# Patient Record
Sex: FEMALE | Race: WHITE | NOT HISPANIC OR LATINO | URBAN - METROPOLITAN AREA
[De-identification: names, ages, dates, MRNs, and addresses within clinical notes are randomized per-mention and may not be internally consistent; named-entity substitution may affect disease eponyms.]

---

## 2017-06-26 ENCOUNTER — EMERGENCY (EMERGENCY)
Facility: HOSPITAL | Age: 27
LOS: 0 days | Discharge: HOME | End: 2017-06-26

## 2017-06-26 DIAGNOSIS — H57.8 OTHER SPECIFIED DISORDERS OF EYE AND ADNEXA: ICD-10-CM

## 2017-06-26 DIAGNOSIS — H53.142 VISUAL DISCOMFORT, LEFT EYE: ICD-10-CM

## 2017-06-26 DIAGNOSIS — F11.20 OPIOID DEPENDENCE, UNCOMPLICATED: ICD-10-CM

## 2017-06-26 DIAGNOSIS — H57.12 OCULAR PAIN, LEFT EYE: ICD-10-CM

## 2018-03-08 ENCOUNTER — OUTPATIENT (OUTPATIENT)
Dept: OUTPATIENT SERVICES | Facility: HOSPITAL | Age: 28
LOS: 1 days | Discharge: HOME | End: 2018-03-08

## 2018-03-08 DIAGNOSIS — F11.20 OPIOID DEPENDENCE, UNCOMPLICATED: ICD-10-CM

## 2018-03-18 ENCOUNTER — EMERGENCY (EMERGENCY)
Facility: HOSPITAL | Age: 28
LOS: 0 days | Discharge: HOME | End: 2018-03-18
Attending: EMERGENCY MEDICINE | Admitting: INTERNAL MEDICINE

## 2018-03-18 VITALS
DIASTOLIC BLOOD PRESSURE: 56 MMHG | RESPIRATION RATE: 18 BRPM | OXYGEN SATURATION: 96 % | HEART RATE: 65 BPM | TEMPERATURE: 98 F | SYSTOLIC BLOOD PRESSURE: 110 MMHG

## 2018-03-18 DIAGNOSIS — R10.9 UNSPECIFIED ABDOMINAL PAIN: ICD-10-CM

## 2018-03-18 DIAGNOSIS — R19.5 OTHER FECAL ABNORMALITIES: ICD-10-CM

## 2018-03-18 DIAGNOSIS — R19.7 DIARRHEA, UNSPECIFIED: ICD-10-CM

## 2018-03-18 NOTE — ED PROVIDER NOTE - MEDICAL DECISION MAKING DETAILS
Patient to be discharged from ED. Patient understands results will not come back immediately. Verbal instructions given, including instructions to return to ED immediately for any new, worsening, or concerning symptoms. Patient endorsed understanding. Written discharge instructions additionally given, including follow-up plan.

## 2018-03-18 NOTE — ED PROVIDER NOTE - CARE PLAN
Assessment and plan of treatment:	Stool does not appear to have any worm segments or other appearance of parasite infection, Only mucus and stool. Sending to lab for eval.

## 2018-03-18 NOTE — ED PROVIDER NOTE - OBJECTIVE STATEMENT
27 y F no pMH pw diarrhea, x 2, starting today, with mucus and 2 small parts of it that the patient was concerned looked like a worm. No recent travel, exposures, unexpected weight loss, or abnormal foods, fever, chills, abd pain, numbness, tingling, change in gait, or prior symptoms. no other family with these symptoms.

## 2018-03-18 NOTE — ED PROVIDER NOTE - PLAN OF CARE
Stool does not appear to have any worm segments or other appearance of parasite infection, Only mucus and stool. Sending to lab for eval.

## 2018-03-18 NOTE — ED PROVIDER NOTE - PHYSICAL EXAMINATION
Vital Signs: I have reviewed the initial vital signs.  Constitutional: NAD, well-nourished, appears stated age, no acute distress.  HEENT: Airway patent, moist MM, no erythema/swelling/deformity of oral structures. EOMI, PERRLA.  CV: regular rate, regular rhythm, well-perfused extremities,  Lungs: BCTA, no focal areas of decreased breath sounds.   ABD: NTND, no guarding or rebound, no pulsatile mass, no hernias.   MSK: Neck supple, nontender, nl ROM, no stepoff. Back nontender in TLS spine or to b/l bony structures or flanks. Ext nontender, nl rom, no deformity.   INTEG: Skin warm, dry, no rash.  NEURO: A&O, normal strength 5/5 all 4 ext, nl sensation throughout, normal speech and coordination.  PSYCH: Calm, cooperative, normal affect and interaction.

## 2018-08-28 ENCOUNTER — OUTPATIENT (OUTPATIENT)
Dept: OUTPATIENT SERVICES | Facility: HOSPITAL | Age: 28
LOS: 1 days | Discharge: HOME | End: 2018-08-28

## 2018-08-28 DIAGNOSIS — F11.20 OPIOID DEPENDENCE, UNCOMPLICATED: ICD-10-CM

## 2018-10-30 ENCOUNTER — OUTPATIENT (OUTPATIENT)
Dept: OUTPATIENT SERVICES | Facility: HOSPITAL | Age: 28
LOS: 1 days | Discharge: HOME | End: 2018-10-30

## 2018-10-30 DIAGNOSIS — F11.20 OPIOID DEPENDENCE, UNCOMPLICATED: ICD-10-CM

## 2018-11-28 ENCOUNTER — OUTPATIENT (OUTPATIENT)
Dept: OUTPATIENT SERVICES | Facility: HOSPITAL | Age: 28
LOS: 1 days | Discharge: HOME | End: 2018-11-28

## 2018-11-28 DIAGNOSIS — F11.20 OPIOID DEPENDENCE, UNCOMPLICATED: ICD-10-CM

## 2018-12-11 ENCOUNTER — OUTPATIENT (OUTPATIENT)
Dept: OUTPATIENT SERVICES | Facility: HOSPITAL | Age: 28
LOS: 1 days | Discharge: HOME | End: 2018-12-11

## 2018-12-11 DIAGNOSIS — F11.20 OPIOID DEPENDENCE, UNCOMPLICATED: ICD-10-CM

## 2019-01-17 ENCOUNTER — OUTPATIENT (OUTPATIENT)
Dept: OUTPATIENT SERVICES | Facility: HOSPITAL | Age: 29
LOS: 1 days | Discharge: HOME | End: 2019-01-17

## 2019-01-17 DIAGNOSIS — F11.20 OPIOID DEPENDENCE, UNCOMPLICATED: ICD-10-CM

## 2019-02-07 ENCOUNTER — OUTPATIENT (OUTPATIENT)
Dept: OUTPATIENT SERVICES | Facility: HOSPITAL | Age: 29
LOS: 1 days | Discharge: HOME | End: 2019-02-07

## 2019-02-07 DIAGNOSIS — F11.20 OPIOID DEPENDENCE, UNCOMPLICATED: ICD-10-CM

## 2019-03-07 ENCOUNTER — OUTPATIENT (OUTPATIENT)
Dept: OUTPATIENT SERVICES | Facility: HOSPITAL | Age: 29
LOS: 1 days | Discharge: HOME | End: 2019-03-07

## 2019-03-07 DIAGNOSIS — F11.20 OPIOID DEPENDENCE, UNCOMPLICATED: ICD-10-CM

## 2019-04-04 ENCOUNTER — OUTPATIENT (OUTPATIENT)
Dept: OUTPATIENT SERVICES | Facility: HOSPITAL | Age: 29
LOS: 1 days | Discharge: HOME | End: 2019-04-04

## 2019-04-04 DIAGNOSIS — F11.20 OPIOID DEPENDENCE, UNCOMPLICATED: ICD-10-CM

## 2019-05-06 ENCOUNTER — OUTPATIENT (OUTPATIENT)
Dept: OUTPATIENT SERVICES | Facility: HOSPITAL | Age: 29
LOS: 1 days | Discharge: HOME | End: 2019-05-06
Payer: MEDICAID

## 2019-05-06 DIAGNOSIS — F11.20 OPIOID DEPENDENCE, UNCOMPLICATED: ICD-10-CM

## 2019-05-06 PROCEDURE — 99212 OFFICE O/P EST SF 10 MIN: CPT

## 2019-06-11 ENCOUNTER — OUTPATIENT (OUTPATIENT)
Dept: OUTPATIENT SERVICES | Facility: HOSPITAL | Age: 29
LOS: 1 days | Discharge: HOME | End: 2019-06-11

## 2019-06-11 DIAGNOSIS — F11.20 OPIOID DEPENDENCE, UNCOMPLICATED: ICD-10-CM

## 2019-06-19 ENCOUNTER — OUTPATIENT (OUTPATIENT)
Dept: OUTPATIENT SERVICES | Facility: HOSPITAL | Age: 29
LOS: 1 days | Discharge: HOME | End: 2019-06-19

## 2019-06-19 DIAGNOSIS — F11.20 OPIOID DEPENDENCE, UNCOMPLICATED: ICD-10-CM

## 2019-06-27 ENCOUNTER — OUTPATIENT (OUTPATIENT)
Dept: OUTPATIENT SERVICES | Facility: HOSPITAL | Age: 29
LOS: 1 days | Discharge: HOME | End: 2019-06-27

## 2019-06-27 DIAGNOSIS — F11.20 OPIOID DEPENDENCE, UNCOMPLICATED: ICD-10-CM

## 2019-07-08 ENCOUNTER — OUTPATIENT (OUTPATIENT)
Dept: OUTPATIENT SERVICES | Facility: HOSPITAL | Age: 29
LOS: 1 days | Discharge: HOME | End: 2019-07-08

## 2019-07-08 NOTE — ED PROVIDER NOTE - NS ED ROS FT
Mervat Spears
Constitutional: (-) fever, (-) chills  Eyes/ENT: (-) blurry vision, (-) epistaxis, (-) sore throat  Cardiovascular: (-) chest pain, (-) syncope  Respiratory: (-) cough, (-) shortness of breath  Gastrointestinal: (-) abdominal pain, (-) vomiting, (+) diarrhea  Musculoskeletal: (-) neck pain, (-) back pain, (-) joint pain  Integumentary: (-) rash, (-) edema  Neurological: (-) headache, (-) altered mental status  Psychiatric: (-) hallucinations,   Allergic/Immunologic: (-) pruritus

## 2019-07-11 ENCOUNTER — OUTPATIENT (OUTPATIENT)
Dept: OUTPATIENT SERVICES | Facility: HOSPITAL | Age: 29
LOS: 1 days | Discharge: HOME | End: 2019-07-11

## 2019-07-11 ENCOUNTER — OUTPATIENT (OUTPATIENT)
Dept: OUTPATIENT SERVICES | Facility: HOSPITAL | Age: 29
LOS: 1 days | Discharge: HOME | End: 2019-07-11
Payer: MEDICAID

## 2019-07-11 DIAGNOSIS — F11.20 OPIOID DEPENDENCE, UNCOMPLICATED: ICD-10-CM

## 2019-07-11 PROCEDURE — 99212 OFFICE O/P EST SF 10 MIN: CPT

## 2019-07-25 ENCOUNTER — OUTPATIENT (OUTPATIENT)
Dept: OUTPATIENT SERVICES | Facility: HOSPITAL | Age: 29
LOS: 1 days | Discharge: HOME | End: 2019-07-25

## 2019-07-25 DIAGNOSIS — F11.20 OPIOID DEPENDENCE, UNCOMPLICATED: ICD-10-CM

## 2019-08-01 ENCOUNTER — OUTPATIENT (OUTPATIENT)
Dept: OUTPATIENT SERVICES | Facility: HOSPITAL | Age: 29
LOS: 1 days | Discharge: HOME | End: 2019-08-01
Payer: MEDICAID

## 2019-08-01 DIAGNOSIS — F11.20 OPIOID DEPENDENCE, UNCOMPLICATED: ICD-10-CM

## 2019-08-01 PROCEDURE — 99212 OFFICE O/P EST SF 10 MIN: CPT

## 2019-08-05 ENCOUNTER — OUTPATIENT (OUTPATIENT)
Dept: OUTPATIENT SERVICES | Facility: HOSPITAL | Age: 29
LOS: 1 days | Discharge: HOME | End: 2019-08-05

## 2019-08-06 DIAGNOSIS — K05.6 PERIODONTAL DISEASE, UNSPECIFIED: ICD-10-CM

## 2022-05-10 ENCOUNTER — EMERGENCY (EMERGENCY)
Facility: HOSPITAL | Age: 32
LOS: 0 days | Discharge: HOME | End: 2022-05-10
Attending: EMERGENCY MEDICINE | Admitting: EMERGENCY MEDICINE
Payer: MEDICAID

## 2022-05-10 VITALS
TEMPERATURE: 98 F | SYSTOLIC BLOOD PRESSURE: 125 MMHG | WEIGHT: 145.06 LBS | OXYGEN SATURATION: 100 % | RESPIRATION RATE: 18 BRPM | DIASTOLIC BLOOD PRESSURE: 70 MMHG | HEART RATE: 85 BPM

## 2022-05-10 DIAGNOSIS — Z87.440 PERSONAL HISTORY OF URINARY (TRACT) INFECTIONS: ICD-10-CM

## 2022-05-10 DIAGNOSIS — R31.9 HEMATURIA, UNSPECIFIED: ICD-10-CM

## 2022-05-10 DIAGNOSIS — R30.0 DYSURIA: ICD-10-CM

## 2022-05-10 DIAGNOSIS — N39.0 URINARY TRACT INFECTION, SITE NOT SPECIFIED: ICD-10-CM

## 2022-05-10 LAB
APPEARANCE UR: ABNORMAL
BACTERIA # UR AUTO: ABNORMAL
BILIRUB UR-MCNC: NEGATIVE — SIGNIFICANT CHANGE UP
COLOR SPEC: YELLOW — SIGNIFICANT CHANGE UP
DIFF PNL FLD: ABNORMAL
EPI CELLS # UR: ABNORMAL /HPF
GLUCOSE UR QL: NEGATIVE MG/DL — SIGNIFICANT CHANGE UP
KETONES UR-MCNC: ABNORMAL
LEUKOCYTE ESTERASE UR-ACNC: ABNORMAL
NITRITE UR-MCNC: NEGATIVE — SIGNIFICANT CHANGE UP
PH UR: 6 — SIGNIFICANT CHANGE UP (ref 5–8)
PROT UR-MCNC: 30 MG/DL
RBC CASTS # UR COMP ASSIST: ABNORMAL /HPF
SP GR SPEC: >=1.03 (ref 1.01–1.03)
UROBILINOGEN FLD QL: 0.2 MG/DL — SIGNIFICANT CHANGE UP
WBC UR QL: ABNORMAL /HPF

## 2022-05-10 PROCEDURE — 99283 EMERGENCY DEPT VISIT LOW MDM: CPT

## 2022-05-10 RX ORDER — NITROFURANTOIN MACROCRYSTAL 50 MG
1 CAPSULE ORAL
Qty: 14 | Refills: 0
Start: 2022-05-10 | End: 2022-05-16

## 2022-05-10 NOTE — ED PROVIDER NOTE - PATIENT PORTAL LINK FT
You can access the FollowMyHealth Patient Portal offered by [facility] by registering at the following website: [email or web address]. By joining [facility]’s FollowMyHealth portal, you will also be able to view your health information using other applications (apps) compatible with our system.

## 2022-05-10 NOTE — ED PROVIDER NOTE - ATTENDING CONTRIBUTION TO CARE
I personally evaluated patient. I agree with the findings and plan with all documentation on chart except as documented  in my note.    32-year-old healthy female presents emergency department with a symptomatic UTI.  No vaginal discharge or flank pain.  Patient is afebrile and eating and drinking normally.  Urine checked and patient is not pregnant we will treat UTI.    Patient is a good candidate to attempt outpatient management. Supportive care and home care discussed in detail. Patient aware they may have to return for re-evaluation and possible admission if outpatient treatment fails. Strict return precautions discussed.

## 2022-05-10 NOTE — ED PROVIDER NOTE - PHYSICAL EXAMINATION
CONSTITUTIONAL: well-developed, well-nourished, in no acute distress  SKIN: warm, dry  HEAD: Normocephalic  EYES: no conjunctival erythema  ENT: no nasal discharge, airway clear  NECK: full ROM  CARD: regular rate and rhythm  RESP: normal respiratory effort  ABD: soft, non-distended, non-tender no cva tenderness  EXT: moving all extremities spontaneously  NEURO: alert and oriented, grossly unremarkable  PSYCH: cooperative, appropriate

## 2022-05-10 NOTE — ED PROVIDER NOTE - OBJECTIVE STATEMENT
32F w/ pmhx of uti who present with dysuria 1x day. symptom consistent with her past uti 3 years ago. lmp 2 weeks ago. she had tinged blood as well associated with her current dysuria. denies hx of sti. sexually active 1 male partner. denies fever chills nausea vomiting flank pain abd pain. denies vaginal rash discharge or malodor. denies frequency.

## 2022-05-10 NOTE — ED PROVIDER NOTE - NS ED ROS FT
Constitutional: No fever   Eyes:  No visual changes  Ears:  No hearing changes  Neck: No neck pain  Cardiac:  No chest pain  Respiratory:  No SOB   GI:  No abdominal pain, nausea, or vomiting  :  +dysuria +hematuria -frequency -vaginal sxs  MS:  No back pain  Neuro:  No headache or weakness.  No LOC  Skin:  No skin rash

## 2022-05-10 NOTE — ED PROVIDER NOTE - NSFOLLOWUPINSTRUCTIONS_ED_ALL_ED_FT
Urinary Tract Infection    You were seen and evelauted in the Emergency Department. It was found to you have a Urinary Tract Infection (UTI). It has been determined that it is safe for you to be discharged and attempt outpatient management. Please take antibiotic as prescribed and monitor your symptoms. If your antibiotic needs to be changed based on test results, you will be contacted. There is a chance you may have to return for re-evaluation and possible admission if oral antibiotics are not working. Please monitor your symptoms and see how you feel after 48 hours of antibiotics, unless one of the strict return precautions we discussed happens. Follow up with your doctor and bring all of your results. You may require further work up and management, which may include evaluaiton by a specialist (Urologist)    Overview  A urinary tract infection, or UTI, is a general term for an infection anywhere between the kidneys and the urethra (where urine comes out). Most UTIs are bladder infections. They often cause pain or burning when you urinate.    UTIs are caused by bacteria and can be cured with antibiotics. Be sure to complete your treatment so that the infection does not get worse.    Follow-up care is a key part of your treatment and safety. Be sure to make and go to all appointments, and call your doctor or nurse call line if you are having problems. It's also a good idea to know your test results and keep a list of the medicines you take.    How can you care for yourself at home?  Take your antibiotics as directed. Do not stop taking them just because you feel better. You need to take the full course of antibiotics.  Drink extra water and other fluids for the next day or two. This will help make the urine less concentrated and help wash out the bacteria that are causing the infection. (If you have kidney, heart, or liver disease and have to limit fluids, talk with your doctor before you increase the amount of fluids you drink.)  Avoid drinks that are carbonated or have caffeine. They can irritate the bladder.  Urinate often. Try to empty your bladder each time.  To relieve pain, take a hot bath or lay a heating pad set on low over your lower belly or genital area. Never go to sleep with a heating pad in place.  To prevent UTIs  Drink plenty of water each day. This helps you urinate often, which clears bacteria from your system. (If you have kidney, heart, or liver disease and have to limit fluids, talk with your doctor before you increase the amount of fluids you drink.)  Urinate when you need to.  If you are sexually active, urinate right after you have sex.  Change sanitary pads often.  Avoid douches, bubble baths, feminine hygiene sprays, and other feminine hygiene products that have deodorants.  After you use the toilet, wipe from front to back.  When should you call for help?  	  Call your doctor or nurse call line now or seek immediate medical care if:    Symptoms such as fever, chills, nausea, or vomiting get worse or appear for the first time.  You have new pain in your back just below your rib cage. This is called flank pain.  There is new blood or pus in your urine.  You have any problems with your antibiotic medicine.  Watch closely for changes in your health, and be sure to contact your doctor or nurse call line if:    You do not feel better after 2 days on an antibiotic.  Your symptoms go away but then come back.

## 2022-05-10 NOTE — ED PROVIDER NOTE - CLINICAL SUMMARY MEDICAL DECISION MAKING FREE TEXT BOX
32-year-old healthy female presents emergency department with a symptomatic UTI.  No vaginal discharge or flank pain.  Patient is afebrile and eating and drinking normally.  Urine checked and patient is not pregnant we will treat UTI.    Patient is a good candidate to attempt outpatient management. Supportive care and home care discussed in detail. Patient aware they may have to return for re-evaluation and possible admission if outpatient treatment fails. Strict return precautions discussed.

## 2022-09-23 NOTE — ED ADULT TRIAGE NOTE - BP NONINVASIVE DIASTOLIC (MM HG)
Called pt. HECTOR on  stating calling to remind him of appointment for stress test Monday 9/26 at 2:00 and to arrive at least 10 minutes early.  I stated if not canceled or rescheduled today and he unable to make it on Monday for some reason then test would need to be scheduled as outpatient at the hospital. 70

## 2022-10-17 ENCOUNTER — OUTPATIENT (OUTPATIENT)
Dept: OUTPATIENT SERVICES | Facility: HOSPITAL | Age: 32
LOS: 1 days | Discharge: HOME | End: 2022-10-17

## 2022-10-17 NOTE — ED ADULT NURSE NOTE - NS ED NURSE LEVEL OF CONSCIOUSNESS SPEECH
Protocol not met: please advise     Rx requested:    Disp Refills Start End    fluticasone (FLONASE) 50 MCG/ACT nasal spray 16 g 5 7/20/2021     Sig - Route: Spray 2 sprays in each nostril daily. - Nasal    Patient taking differently: Spray 2 sprays in each nostril as needed.        Sent to pharmacy as: Fluticasone Propionate 50 MCG/ACT Nasal Suspension (FLONASE)    Class: Eprescribe    E-Prescribing Status: Receipt confirmed by pharmacy (7/20/2021 10:21 AM CDT)        Last appointment date: 10/20/2022    Provider seen: Yumiko    Next appointment date: 10/20/2022 with: Yumiko    
Speaking Coherently

## 2023-09-29 ENCOUNTER — OUTPATIENT (OUTPATIENT)
Dept: OUTPATIENT SERVICES | Facility: HOSPITAL | Age: 33
LOS: 1 days | End: 2023-09-29
Payer: COMMERCIAL

## 2023-09-29 DIAGNOSIS — K02.52 DENTAL CARIES ON PIT AND FISSURE SURFACE PENETRATING INTO DENTIN: ICD-10-CM

## 2023-09-29 DIAGNOSIS — K02.62 DENTAL CARIES ON SMOOTH SURFACE PENETRATING INTO DENTIN: ICD-10-CM

## 2023-09-29 PROCEDURE — D2160: CPT

## 2023-11-07 ENCOUNTER — APPOINTMENT (OUTPATIENT)
Dept: PLASTIC SURGERY | Facility: CLINIC | Age: 33
End: 2023-11-07
Payer: COMMERCIAL

## 2023-11-07 VITALS — BODY MASS INDEX: 27.49 KG/M2 | WEIGHT: 165 LBS | HEIGHT: 65 IN

## 2023-11-07 DIAGNOSIS — Z78.9 OTHER SPECIFIED HEALTH STATUS: ICD-10-CM

## 2023-11-07 PROCEDURE — 99203 OFFICE O/P NEW LOW 30 MIN: CPT

## 2023-11-07 RX ORDER — BUPRENORPHINE HCL 75 MCG
75 FILM, MEDICATED (EA) BUCCAL
Refills: 0 | Status: ACTIVE | COMMUNITY

## 2023-12-22 ENCOUNTER — APPOINTMENT (OUTPATIENT)
Dept: PLASTIC SURGERY | Facility: CLINIC | Age: 33
End: 2023-12-22
Payer: COMMERCIAL

## 2023-12-22 DIAGNOSIS — D48.5 NEOPLASM OF UNCERTAIN BEHAVIOR OF SKIN: ICD-10-CM

## 2023-12-22 PROCEDURE — 13101 CMPLX RPR TRUNK 2.6-7.5 CM: CPT

## 2023-12-22 PROCEDURE — 11404 EXC TR-EXT B9+MARG 3.1-4 CM: CPT

## 2023-12-22 NOTE — PROCEDURE
[FreeTextEntry6] : Patient is a 33 year old female with a central chest keloid measuring approximately 4 x 1.5 cm.    The area was prepped and draped in the usual fashion.  Local anesthetic was administered using 1% lidocaine with epinephrine.  Lesion was sharply excised.  Area was irrigated copiously.  Complex wound closure was performed in layers.  The wound measured approximately 4.2 cm.  0.1 mg kenalog injected.  Sterile dressing applied.    Patient tolerated procedure well and understands post-op instructions.  Sutures Used: 3-0 monocryl

## 2023-12-27 LAB — CORE LAB BIOPSY: NORMAL

## 2024-01-05 ENCOUNTER — APPOINTMENT (OUTPATIENT)
Dept: PLASTIC SURGERY | Facility: CLINIC | Age: 34
End: 2024-01-05
Payer: COMMERCIAL

## 2024-01-05 PROCEDURE — 99213 OFFICE O/P EST LOW 20 MIN: CPT | Mod: 25

## 2024-01-05 NOTE — HISTORY OF PRESENT ILLNESS
[FreeTextEntry1] : 32 y/o female with no significant pmh who presents for evaluation of    Occ: self employed  as above  22 yr h/o central chest keloid  m,easures approx 1cm x 3xcm  no prior tx  dicussed all options  nmonsmoker nondiabetic  Interval hx (1/5/24): Pt is 2 weeks s/p excision of central chest keloid with kenalog injection. Here for her first post-op. Doing well, denies fever/chills/drainage or tenderness.

## 2024-01-05 NOTE — PHYSICAL EXAM
[de-identified] : Well developed, well nourished in NAD  [de-identified] : Atraumatic, normocephalic  [de-identified] : central chest horizontal 3cm incision is CDI, no open areas/drainage/cellulitis/erythema. Small protruding monocryl at distal end. No evidence of HTS yet

## 2024-01-05 NOTE — ASSESSMENT
[FreeTextEntry1] : dicsussed options excision excision + steroids excision + post RT pt elects for excision w post procedure steroid injection  Pt is 2 weeks s/p excision of central chest keloid with kenalog injection.  - d/c protruding monocryl - Aquaphor x3 d then switch to silicone scar sheets - s/s infection reviewed - f/u 4-6 weeks for possible kenalog injection

## 2024-01-05 NOTE — DATA REVIEWED
[FreeTextEntry1] : Patient:     SHANTE BELLAMY   Accession:                             [id number]  Collected Date/Time:                   12/22/2023 10:22 EST Received Date/Time:                    12/26/2023 09:27 EST  Surgical Pathology Report - Auth (Verified)  Specimen(s) Submitted Central chest keloid  Final Diagnosis Central chest keloid, excision: -  Keloid.  Verified by: Kandi Mead M.D. (Electronic Signature) Reported on: 12/27/23 15:37 EST, [facility], [address] [place] [address] Phone: [phone]   Fax: [phone] _________________________________________________________________  Clinical Information Central chest keloid   Perioperative Diagnosis D48.5-Neoplasm of uncertain behavior of skin  Gross Description The specimen is received in formalin labeled "central chest keloid" and consists an unoriented skin ellipse with attached subcutaneous tissue measuring 2.5 x 0.8 x 0.5 cm.  On the skin surface there is a firm raised area measuring 1.3 cm in maximum diameter.  The deep margin is inked black. Serial sectioning shows tan-white firm fibrotic area. Representative sections are submitted. (1 block)  12/26/2023 14:21:57 EST  st   Disclaimer In addition to other data that may appear on the specimen containers, all labels have been inspected to confirm the presence of the patients name and date of birth.  Specimen was received and underwent gross examination at [facility], [address] [place] [address].  Ordered by: RONNY MACHADO IV       Collected/Examined: 50Wsv4872 10:22AM       Verification Required       Stage: Final       Performed at: [facility] (Med Director: Gage Lopez)       Resulted: 90Dho6819 03:37PM       Last Updated: 27Dec2023 03:37PM       Accession: [id number]       Results Hx:	 There are no additional results to show Details:	 To Be Done:	67Ujs5585 Status:	Resulted: Requires Verification For:	Neoplasm of uncertain behavior of skin (D48.5) Overdue:	38Fyn9575 10:19AM To Be Performed:	[facility] Communicated By:	Requested transmission to performing location Priority:	Routine Ordered By:	RONNY MACHADO IV Supervised By:	RONNY MACHADO IV Managed By:	RONNY MACHADO IV Authorization:	Not Required Performing Instructions:	 Patient Instructions:	 Order Instructions:	 Questions:	 Date/Time Collected A: 23Imk1683 Number of Sites (Enter each site description below.) A: 1 Site of Specimen A: Central chest keloid Add'l Details:	 Financial Auth:	Not Needed Authorization #:	 Appt. Status:	Appointment Not Needed Effective:	28Urh2629 12:00AM Expires:	43Rkl2466 12:00AM Done:	09Fre6028 10:22AM Order #:	[id number] Requisition #:	[id number] Label Type:	 Collection:	Collection Specimen Identifier:	 ID:	 CPT:	 LOINC:	 SNOMED:	 Type:	 Charges:	None Will Be Collected in Office?	No View link item history	 Goals:	None Charging:	          (none) Encounters:	 Creation:	[id number] Appointment RONNY MACHADO IV (Plastic Surgery)  Collection:	None Specified Be Done By:	None Specified Scheduled:	None Specified Performed:	None Specified Charge :	None Specified Annotations:	          (none) Electronically Signed By: RONNY MACHADO IV, MD 22-Dec-2023 10:20 AM

## 2024-01-29 ENCOUNTER — APPOINTMENT (OUTPATIENT)
Dept: PLASTIC SURGERY | Facility: CLINIC | Age: 34
End: 2024-01-29
Payer: COMMERCIAL

## 2024-01-29 PROCEDURE — 99024 POSTOP FOLLOW-UP VISIT: CPT

## 2024-01-29 NOTE — ASSESSMENT
[FreeTextEntry1] :   Pt is almost 6 weeks s/p excision of central chest keloid with kenalog injection.   - Aquaphor only to R distal wound  -Ok to continue silicone scar sheet to remainder of incision along with heat and scar massgae (Pt feels subq stitch at other end of incision) - s/s infection reviewed - Keep f/u next month for possible kenalog injection / wound check

## 2024-01-29 NOTE — PHYSICAL EXAM
[de-identified] : Well developed, well nourished in NAD  [de-identified] : Atraumatic, normocephalic  [de-identified] : central chest horizontal 3cm incision with 2mm circular partial thickness wound at R distal aspect of incision, healthy pink wound bed with no cellulitis or drainage. Remainder of incision line is CDI, no open areas/drainage/cellulitis/erythema.  No evidence of HTS yet

## 2024-01-29 NOTE — DATA REVIEWED
[FreeTextEntry1] : Patient:     SHANTE BELLAMY   Accession:                             [id number]  Collected Date/Time:                   12/22/2023 10:22 EST Received Date/Time:                    12/26/2023 09:27 EST  Surgical Pathology Report - Auth (Verified)  Specimen(s) Submitted Central chest keloid  Final Diagnosis Central chest keloid, excision: -  Keloid.  Verified by: Kandi Mead M.D. (Electronic Signature) Reported on: 12/27/23 15:37 EST, [facility], [address] [place] [address] Phone: [phone]   Fax: [phone] _________________________________________________________________  Clinical Information Central chest keloid   Perioperative Diagnosis D48.5-Neoplasm of uncertain behavior of skin  Gross Description The specimen is received in formalin labeled "central chest keloid" and consists an unoriented skin ellipse with attached subcutaneous tissue measuring 2.5 x 0.8 x 0.5 cm.  On the skin surface there is a firm raised area measuring 1.3 cm in maximum diameter.  The deep margin is inked black. Serial sectioning shows tan-white firm fibrotic area. Representative sections are submitted. (1 block)  12/26/2023 14:21:57 EST  st   Disclaimer In addition to other data that may appear on the specimen containers, all labels have been inspected to confirm the presence of the patients name and date of birth.  Specimen was received and underwent gross examination at [facility], [address] [place] [address].  Ordered by: RONNY MACHADO IV       Collected/Examined: 94Maq4860 10:22AM       Verification Required       Stage: Final       Performed at: [facility] (Med Director: Gage Lopez)       Resulted: 70Twn3329 03:37PM       Last Updated: 27Dec2023 03:37PM       Accession: [id number]       Results Hx:	 There are no additional results to show Details:	 To Be Done:	78Sdv8837 Status:	Resulted: Requires Verification For:	Neoplasm of uncertain behavior of skin (D48.5) Overdue:	46Cyp1235 10:19AM To Be Performed:	[facility] Communicated By:	Requested transmission to performing location Priority:	Routine Ordered By:	RONNY MACHADO IV Supervised By:	RONNY MACHADO IV Managed By:	RONNY MACHADO IV Authorization:	Not Required Performing Instructions:	 Patient Instructions:	 Order Instructions:	 Questions:	 Date/Time Collected A: 08Jad4592 Number of Sites (Enter each site description below.) A: 1 Site of Specimen A: Central chest keloid Add'l Details:	 Financial Auth:	Not Needed Authorization #:	 Appt. Status:	Appointment Not Needed Effective:	48Kxh9933 12:00AM Expires:	65Ilx3294 12:00AM Done:	03Wwz4826 10:22AM Order #:	[id number] Requisition #:	[id number] Label Type:	 Collection:	Collection Specimen Identifier:	 ID:	 CPT:	 LOINC:	 SNOMED:	 Type:	 Charges:	None Will Be Collected in Office?	No View link item history	 Goals:	None Charging:	          (none) Encounters:	 Creation:	[id number] Appointment RONNY MACHADO IV (Plastic Surgery)  Collection:	None Specified Be Done By:	None Specified Scheduled:	None Specified Performed:	None Specified Charge :	None Specified Annotations:	          (none) Electronically Signed By: RONNY MACHADO IV, MD 22-Dec-2023 10:20 AM

## 2024-01-29 NOTE — HISTORY OF PRESENT ILLNESS
[FreeTextEntry1] : 34 y/o female with no significant pmh who presents for evaluation of central chest keloid.   Occ: self employed  measures approx 1cm x 3xcm no prior tx  nonsmoker nondiabetic  Interval hx (1/5/24): Pt is 2 weeks s/p excision of central chest keloid with kenalog injection. Here for her first post-op. Doing well, denies fever/chills/drainage or tenderness.   Interval hx (1/29/24): Pt is almost 6 weeks s/p excision of central chest keloid with kenalog injection. Here with concern of new opening on incision line. Reports small amount ot thin yellow tinged drainage. Using silicone strip to remainder of incision. Denies f/c.

## 2024-02-27 ENCOUNTER — APPOINTMENT (OUTPATIENT)
Dept: PLASTIC SURGERY | Facility: CLINIC | Age: 34
End: 2024-02-27

## 2024-03-05 ENCOUNTER — OUTPATIENT (OUTPATIENT)
Dept: OUTPATIENT SERVICES | Facility: HOSPITAL | Age: 34
LOS: 1 days | End: 2024-03-05
Payer: COMMERCIAL

## 2024-03-05 DIAGNOSIS — K02.9 DENTAL CARIES, UNSPECIFIED: ICD-10-CM

## 2024-03-05 PROCEDURE — D0220: CPT

## 2024-03-05 PROCEDURE — D9110: CPT

## 2024-03-06 DIAGNOSIS — K02.9 DENTAL CARIES, UNSPECIFIED: ICD-10-CM

## 2024-04-09 ENCOUNTER — APPOINTMENT (OUTPATIENT)
Dept: PLASTIC SURGERY | Facility: CLINIC | Age: 34
End: 2024-04-09
Payer: COMMERCIAL

## 2024-04-09 PROCEDURE — 99212 OFFICE O/P EST SF 10 MIN: CPT

## 2024-04-09 NOTE — PHYSICAL EXAM
[de-identified] : Well developed, well nourished in NAD  [de-identified] : Atraumatic, normocephalic  [de-identified] : central chest with raised 3x0.7 cm keloid scar

## 2024-04-09 NOTE — ASSESSMENT
[FreeTextEntry1] : 34 yo F 3.5 months s/p excision of central chest keloid with Kenalog injection now with recurrent keloid.   - Kenalog injection vs. re-excision with intraop kenalog injection    as above recurrent sternal keloid  discussed options pt still wants to avoid post excision RT  d/w Dr Perez pt to see Chris to see if laser tx an option.  otherwise could consider repeat excision w wider margins, in [place], w higher dose steroid injection  explained plan to pt--she understands and agrees.

## 2024-04-09 NOTE — DATA REVIEWED
[FreeTextEntry1] : Patient:     SHANTE BELLAMY   Accession:                             [id number]  Collected Date/Time:                   12/22/2023 10:22 EST Received Date/Time:                    12/26/2023 09:27 EST  Surgical Pathology Report - Auth (Verified)  Specimen(s) Submitted Central chest keloid  Final Diagnosis Central chest keloid, excision: -  Keloid.  Verified by: Kandi Mead M.D. (Electronic Signature) Reported on: 12/27/23 15:37 EST, [facility], [address] [place] [address] Phone: [phone]   Fax: [phone] _________________________________________________________________  Clinical Information Central chest keloid   Perioperative Diagnosis D48.5-Neoplasm of uncertain behavior of skin  Gross Description The specimen is received in formalin labeled "central chest keloid" and consists an unoriented skin ellipse with attached subcutaneous tissue measuring 2.5 x 0.8 x 0.5 cm.  On the skin surface there is a firm raised area measuring 1.3 cm in maximum diameter.  The deep margin is inked black. Serial sectioning shows tan-white firm fibrotic area. Representative sections are submitted. (1 block)  12/26/2023 14:21:57 EST  st   Disclaimer In addition to other data that may appear on the specimen containers, all labels have been inspected to confirm the presence of the patients name and date of birth.  Specimen was received and underwent gross examination at [facility], [address] [place] [address].  Ordered by: RONNY MACHADO IV       Collected/Examined: 39Zqh6807 10:22AM       Verification Required       Stage: Final       Performed at: [facility] (Med Director: Gage Lopez)       Resulted: 28Glz9433 03:37PM       Last Updated: 27Dec2023 03:37PM       Accession: [id number]       Results Hx:	 There are no additional results to show Details:	 To Be Done:	02Onu0514 Status:	Resulted: Requires Verification For:	Neoplasm of uncertain behavior of skin (D48.5) Overdue:	20Lam0911 10:19AM To Be Performed:	[facility] Communicated By:	Requested transmission to performing location Priority:	Routine Ordered By:	RONNY MACHADO IV Supervised By:	RONNY MACHADO IV Managed By:	RONNY MACHADO IV Authorization:	Not Required Performing Instructions:	 Patient Instructions:	 Order Instructions:	 Questions:	 Date/Time Collected A: 71Twe5961 Number of Sites (Enter each site description below.) A: 1 Site of Specimen A: Central chest keloid Add'l Details:	 Financial Auth:	Not Needed Authorization #:	 Appt. Status:	Appointment Not Needed Effective:	71Kka9151 12:00AM Expires:	66Ajr6087 12:00AM Done:	38Tnw2573 10:22AM Order #:	[id number] Requisition #:	[id number] Label Type:	 Collection:	Collection Specimen Identifier:	 ID:	 CPT:	 LOINC:	 SNOMED:	 Type:	 Charges:	None Will Be Collected in Office?	No View link item history	 Goals:	None Charging:	          (none) Encounters:	 Creation:	[id number] Appointment RONNY MACHADO IV (Plastic Surgery)  Collection:	None Specified Be Done By:	None Specified Scheduled:	None Specified Performed:	None Specified Charge :	None Specified Annotations:	          (none) Electronically Signed By: RONNY MACHADO IV, MD 22-Dec-2023 10:20 AM

## 2024-04-09 NOTE — HISTORY OF PRESENT ILLNESS
[FreeTextEntry1] : 32 y/o female with no significant pmh who presents for evaluation of central chest keloid.   Occ: self employed  measures approx 1cm x 3xcm no prior tx  nonsmoker nondiabetic  Interval hx (1/5/24): Pt is 2 weeks s/p excision of central chest keloid with kenalog injection. Here for her first post-op. Doing well, denies fever/chills/drainage or tenderness.   Interval hx (1/29/24): Pt is almost 6 weeks s/p excision of central chest keloid with kenalog injection. Here with concern of new opening on incision line. Reports small amount ot thin yellow tinged drainage. Using silicone strip to remainder of incision. Denies f/c.   Interval hx (4/9/24): Pt is here 3.5 months s/p excision of central chest keloid with kenalog injection concerned with keloid recurrence c/o pressure, discomfort and unpleasant aesthetic result. Pt has used silicone tape after surgery.

## 2024-04-23 ENCOUNTER — OUTPATIENT (OUTPATIENT)
Dept: OUTPATIENT SERVICES | Facility: HOSPITAL | Age: 34
LOS: 1 days | End: 2024-04-23
Payer: COMMERCIAL

## 2024-04-23 ENCOUNTER — APPOINTMENT (OUTPATIENT)
Dept: BURN CARE | Facility: CLINIC | Age: 34
End: 2024-04-23
Payer: COMMERCIAL

## 2024-04-23 DIAGNOSIS — Z00.8 ENCOUNTER FOR OTHER GENERAL EXAMINATION: ICD-10-CM

## 2024-04-23 PROCEDURE — 99202 OFFICE O/P NEW SF 15 MIN: CPT
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
## 2024-04-23 NOTE — REASON FOR VISIT
[Initial] : initial visit [Were you seen in the Emergency Room?] : not seen in the emergency room [Were you admitted to the burn center at [facility]?] : not admitted to the burn center at [facility]

## 2024-04-23 NOTE — ASSESSMENT
[FreeTextEntry1] : The recurrent chest keloid measures 2x3cm  and is nontender.  The patient was instructed to clean  the wound with soap and water. Continue local wound care with moisturizer and sunscreen. Will CO2 laser.  Follow up 1-2 months.  [Wound Care] : wound care

## 2024-04-23 NOTE — PHYSICAL EXAM
[Closed] : closed [Size%: ______] : Size: [unfilled]% [Infected?] : Infected: No [0] : 0 out of 10 [Normal] : normal [None] : none [] : no [de-identified] : The recurrent chest keloid measures 2x3cm  and is nontender.  The patient was instructed to clean  the wound with soap and water. Continue local wound care with moisturizer and sunscreen. Will CO2 laser.  Follow up 1-2 months.

## 2024-04-24 DIAGNOSIS — L91.0 HYPERTROPHIC SCAR: ICD-10-CM

## 2024-05-14 ENCOUNTER — OUTPATIENT (OUTPATIENT)
Dept: OUTPATIENT SERVICES | Facility: HOSPITAL | Age: 34
LOS: 1 days | End: 2024-05-14
Payer: COMMERCIAL

## 2024-05-14 VITALS
OXYGEN SATURATION: 99 % | HEIGHT: 65 IN | SYSTOLIC BLOOD PRESSURE: 112 MMHG | HEART RATE: 77 BPM | WEIGHT: 169.98 LBS | DIASTOLIC BLOOD PRESSURE: 70 MMHG | TEMPERATURE: 98 F | RESPIRATION RATE: 18 BRPM

## 2024-05-14 DIAGNOSIS — L91.0 HYPERTROPHIC SCAR: ICD-10-CM

## 2024-05-14 DIAGNOSIS — Z98.890 OTHER SPECIFIED POSTPROCEDURAL STATES: Chronic | ICD-10-CM

## 2024-05-14 DIAGNOSIS — Z01.818 ENCOUNTER FOR OTHER PREPROCEDURAL EXAMINATION: ICD-10-CM

## 2024-05-14 LAB
ALBUMIN SERPL ELPH-MCNC: 4.6 G/DL — SIGNIFICANT CHANGE UP (ref 3.5–5.2)
ALP SERPL-CCNC: 58 U/L — SIGNIFICANT CHANGE UP (ref 30–115)
ALT FLD-CCNC: 13 U/L — SIGNIFICANT CHANGE UP (ref 0–41)
ANION GAP SERPL CALC-SCNC: 11 MMOL/L — SIGNIFICANT CHANGE UP (ref 7–14)
AST SERPL-CCNC: 16 U/L — SIGNIFICANT CHANGE UP (ref 0–41)
BASOPHILS # BLD AUTO: 0.05 K/UL — SIGNIFICANT CHANGE UP (ref 0–0.2)
BASOPHILS NFR BLD AUTO: 0.7 % — SIGNIFICANT CHANGE UP (ref 0–1)
BILIRUB SERPL-MCNC: 0.5 MG/DL — SIGNIFICANT CHANGE UP (ref 0.2–1.2)
BUN SERPL-MCNC: 14 MG/DL — SIGNIFICANT CHANGE UP (ref 10–20)
CALCIUM SERPL-MCNC: 9.4 MG/DL — SIGNIFICANT CHANGE UP (ref 8.4–10.5)
CHLORIDE SERPL-SCNC: 101 MMOL/L — SIGNIFICANT CHANGE UP (ref 98–110)
CO2 SERPL-SCNC: 26 MMOL/L — SIGNIFICANT CHANGE UP (ref 17–32)
CREAT SERPL-MCNC: 0.7 MG/DL — SIGNIFICANT CHANGE UP (ref 0.7–1.5)
EGFR: 118 ML/MIN/1.73M2 — SIGNIFICANT CHANGE UP
EOSINOPHIL # BLD AUTO: 0.11 K/UL — SIGNIFICANT CHANGE UP (ref 0–0.7)
EOSINOPHIL NFR BLD AUTO: 1.5 % — SIGNIFICANT CHANGE UP (ref 0–8)
GLUCOSE SERPL-MCNC: 80 MG/DL — SIGNIFICANT CHANGE UP (ref 70–99)
HCT VFR BLD CALC: 40.9 % — SIGNIFICANT CHANGE UP (ref 37–47)
HGB BLD-MCNC: 14.1 G/DL — SIGNIFICANT CHANGE UP (ref 12–16)
IMM GRANULOCYTES NFR BLD AUTO: 0.3 % — SIGNIFICANT CHANGE UP (ref 0.1–0.3)
LYMPHOCYTES # BLD AUTO: 1.99 K/UL — SIGNIFICANT CHANGE UP (ref 1.2–3.4)
LYMPHOCYTES # BLD AUTO: 28 % — SIGNIFICANT CHANGE UP (ref 20.5–51.1)
MCHC RBC-ENTMCNC: 28.6 PG — SIGNIFICANT CHANGE UP (ref 27–31)
MCHC RBC-ENTMCNC: 34.5 G/DL — SIGNIFICANT CHANGE UP (ref 32–37)
MCV RBC AUTO: 83 FL — SIGNIFICANT CHANGE UP (ref 81–99)
MONOCYTES # BLD AUTO: 0.35 K/UL — SIGNIFICANT CHANGE UP (ref 0.1–0.6)
MONOCYTES NFR BLD AUTO: 4.9 % — SIGNIFICANT CHANGE UP (ref 1.7–9.3)
NEUTROPHILS # BLD AUTO: 4.59 K/UL — SIGNIFICANT CHANGE UP (ref 1.4–6.5)
NEUTROPHILS NFR BLD AUTO: 64.6 % — SIGNIFICANT CHANGE UP (ref 42.2–75.2)
NRBC # BLD: 0 /100 WBCS — SIGNIFICANT CHANGE UP (ref 0–0)
PLATELET # BLD AUTO: 270 K/UL — SIGNIFICANT CHANGE UP (ref 130–400)
PMV BLD: 10.4 FL — SIGNIFICANT CHANGE UP (ref 7.4–10.4)
POTASSIUM SERPL-MCNC: 4.5 MMOL/L — SIGNIFICANT CHANGE UP (ref 3.5–5)
POTASSIUM SERPL-SCNC: 4.5 MMOL/L — SIGNIFICANT CHANGE UP (ref 3.5–5)
PROT SERPL-MCNC: 7.3 G/DL — SIGNIFICANT CHANGE UP (ref 6–8)
RBC # BLD: 4.93 M/UL — SIGNIFICANT CHANGE UP (ref 4.2–5.4)
RBC # FLD: 12.2 % — SIGNIFICANT CHANGE UP (ref 11.5–14.5)
SODIUM SERPL-SCNC: 138 MMOL/L — SIGNIFICANT CHANGE UP (ref 135–146)
WBC # BLD: 7.11 K/UL — SIGNIFICANT CHANGE UP (ref 4.8–10.8)
WBC # FLD AUTO: 7.11 K/UL — SIGNIFICANT CHANGE UP (ref 4.8–10.8)

## 2024-05-14 PROCEDURE — 36415 COLL VENOUS BLD VENIPUNCTURE: CPT

## 2024-05-14 PROCEDURE — 85025 COMPLETE CBC W/AUTO DIFF WBC: CPT

## 2024-05-14 PROCEDURE — 80053 COMPREHEN METABOLIC PANEL: CPT

## 2024-05-14 PROCEDURE — 99214 OFFICE O/P EST MOD 30 MIN: CPT | Mod: 25

## 2024-05-14 NOTE — H&P PST ADULT - HISTORY OF PRESENT ILLNESS
PATIENT DENIES CHEST PAIN, SHORTNESS OF BREATH, PALPITATIONS, COUGHING, FEVER, DYSURIA.    NO COUGH, FEVER, SORE THROAT, HEADACHE, LOSS OF TASTE OR SMELL. NO KNOWN EXPOSURE TO ANYONE WITH COVID. PATIENT WAS INSTRUCTED TO ISOLATE FROM NOW UNTIL THE SURGERY.    Anesthesia Alert  NO--Difficult Airway  NO--History of neck surgery or radiation  NO--Limited ROM of neck  NO--History of Malignant hyperthermia  NO--Personal or family history of Pseudocholinesterase deficiency  NO--Prior Anesthesia Complication  NO--Latex Allergy  NO--Loose teeth  NO--History of Rheumatoid Arthritis  NO--BRANDON  NO--bleeding risk    DASI=9.89 METS  RCRI=0

## 2024-05-14 NOTE — H&P PST ADULT - NSICDXFAMILYHX_GEN_ALL_CORE_FT
FAMILY HISTORY:  Family history of diabetes mellitus (DM)    Father  Still living? Yes, Estimated age: Age Unknown  FH: HTN (hypertension), Age at diagnosis: Age Unknown
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
## 2024-05-15 DIAGNOSIS — L91.0 HYPERTROPHIC SCAR: ICD-10-CM

## 2024-05-15 DIAGNOSIS — Z01.818 ENCOUNTER FOR OTHER PREPROCEDURAL EXAMINATION: ICD-10-CM

## 2024-05-22 ENCOUNTER — OUTPATIENT (OUTPATIENT)
Dept: OUTPATIENT SERVICES | Facility: HOSPITAL | Age: 34
LOS: 1 days | Discharge: HOME | End: 2024-05-22
Payer: COMMERCIAL

## 2024-05-22 ENCOUNTER — TRANSCRIPTION ENCOUNTER (OUTPATIENT)
Age: 34
End: 2024-05-22

## 2024-05-22 VITALS
HEIGHT: 65 IN | DIASTOLIC BLOOD PRESSURE: 53 MMHG | HEART RATE: 70 BPM | WEIGHT: 169.98 LBS | OXYGEN SATURATION: 99 % | SYSTOLIC BLOOD PRESSURE: 98 MMHG | TEMPERATURE: 98 F | RESPIRATION RATE: 16 BRPM

## 2024-05-22 VITALS
DIASTOLIC BLOOD PRESSURE: 74 MMHG | OXYGEN SATURATION: 99 % | RESPIRATION RATE: 14 BRPM | SYSTOLIC BLOOD PRESSURE: 110 MMHG | HEART RATE: 76 BPM

## 2024-05-22 DIAGNOSIS — Z98.890 OTHER SPECIFIED POSTPROCEDURAL STATES: Chronic | ICD-10-CM

## 2024-05-22 DIAGNOSIS — L91.0 HYPERTROPHIC SCAR: ICD-10-CM

## 2024-05-22 PROCEDURE — 0479T FXJL ABL LSR 1ST 100 SQ CM: CPT

## 2024-05-22 RX ORDER — HYDROMORPHONE HYDROCHLORIDE 2 MG/ML
0.5 INJECTION INTRAMUSCULAR; INTRAVENOUS; SUBCUTANEOUS
Refills: 0 | Status: DISCONTINUED | OUTPATIENT
Start: 2024-05-22 | End: 2024-05-22

## 2024-05-22 RX ORDER — ONDANSETRON 8 MG/1
4 TABLET, FILM COATED ORAL ONCE
Refills: 0 | Status: DISCONTINUED | OUTPATIENT
Start: 2024-05-22 | End: 2024-05-22

## 2024-05-22 RX ORDER — SODIUM CHLORIDE 9 MG/ML
1000 INJECTION, SOLUTION INTRAVENOUS
Refills: 0 | Status: DISCONTINUED | OUTPATIENT
Start: 2024-05-22 | End: 2024-05-22

## 2024-05-22 RX ORDER — OXYCODONE AND ACETAMINOPHEN 5; 325 MG/1; MG/1
5-325 TABLET ORAL
Qty: 10 | Refills: 0 | Status: ACTIVE | COMMUNITY
Start: 2024-05-22 | End: 1900-01-01

## 2024-05-22 NOTE — CHART NOTE - NSCHARTNOTEFT_GEN_A_CORE
PACU ANESTHESIA ADMISSION NOTE      Procedure:   Post op diagnosis:      ____  Intubated  TV:______       Rate: ______      FiO2: ______    __x__  Patent Airway    ____  Full return of protective reflexes    ____  Full recovery from anesthesia / back to baseline     Vitals:   T:   98        R:  12                BP:  106/67                Sat:     100%              P:  86      Mental Status:  __x__ Awake   _____ Alert   _____ Drowsy   _____ Sedated    Nausea/Vomiting:  __x__ NO  ______Yes,   See Post - Op Orders          Pain Scale (0-10):  _____    Treatment: ____ None    ___x_ See Post - Op/PCA Orders    Post - Operative Fluids:   ____ Oral   ___x_ See Post - Op Orders    Plan: Discharge:   ____Home       ___x__Floor     _____Critical Care    _____  Other:_________________    Comments: Uneventful intraoperative course. No anesthesia issues or complications noted.  Patient stable upon arrival to PACU. Report given to RN. Discharge when criteria met.

## 2024-05-22 NOTE — PRE-ANESTHESIA EVALUATION ADULT - NSANTHTIREDRD_ENT_A_CORE
65 year old female PMH HTN, DM, breast cancer s/p radiation, multiple abd surgeries, presents to ED for abdominal pain. Pain started 10 days ago, had a period of relief but pain returned yesterday. Pain is diffuse, feels like her abdomen is larger than normal. Having normal BM, no blood noted, +flatus. Pain is worse w/ movement and forward bending. A/w nausea. Denies f/c, cp, sob, vomiting, constipation, dysuria, or back pain.
No
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
## 2024-05-22 NOTE — ASU DISCHARGE PLAN (ADULT/PEDIATRIC) - CARE PROVIDER_API CALL
Mina Perez  Plastic Surgery  [address] [place] [id number]  Phone: [phone]  Fax: [phone]  Follow Up Time: 2 weeks

## 2024-06-06 ENCOUNTER — APPOINTMENT (OUTPATIENT)
Dept: BURN CARE | Facility: CLINIC | Age: 34
End: 2024-06-06
Payer: COMMERCIAL

## 2024-06-06 ENCOUNTER — OUTPATIENT (OUTPATIENT)
Dept: OUTPATIENT SERVICES | Facility: HOSPITAL | Age: 34
LOS: 1 days | End: 2024-06-06
Payer: COMMERCIAL

## 2024-06-06 VITALS — SYSTOLIC BLOOD PRESSURE: 107 MMHG | DIASTOLIC BLOOD PRESSURE: 60 MMHG | HEART RATE: 80 BPM

## 2024-06-06 DIAGNOSIS — Z00.8 ENCOUNTER FOR OTHER GENERAL EXAMINATION: ICD-10-CM

## 2024-06-06 DIAGNOSIS — Z98.890 OTHER SPECIFIED POSTPROCEDURAL STATES: Chronic | ICD-10-CM

## 2024-06-06 PROCEDURE — 99213 OFFICE O/P EST LOW 20 MIN: CPT

## 2024-06-06 NOTE — ASSESSMENT
[FreeTextEntry1] :  Subjective:   - Summary: The patient, Susan Garrison, a 34-year-old female, is here for a follow-up visit after undergoing CO2 laser treatment two weeks ago for a recurrent keloid on her chest.   - Chief Complaint (CC): Follow-up evaluation of recurrent keloid on the chest, status post CO2 laser treatment two weeks ago.   - History of Present Illness: Susan Garrison, a 34-year-old female, presented for a follow-up visit regarding a recurrent keloid on her chest. Two weeks prior, she underwent CO2 laser treatment for the keloid. During the current visit, she reported mild tenderness and pain at the treatment site. However, she denied any chest pain, abdominal pain, cardiac symptoms, or heart-related discomfort.   - Past Medical History: The patient has no significant past medical history reported.   - Past Surgical History: No past surgical history was provided.   - Family History:   - Social History:   - Review of Systems: No specific review of systems was documented.   - General:   - Neurological:   - Musculoskeletal:   - Cardiovascular:   - Respiratory:   - Gastrointestinal:   - Genitourinary:   - Integumentary:   - Psychiatric:   - Medications:   - Allergies:   Objective:   - Diagnostic Results: No diagnostic results were provided.   - Vital Signs: No vital signs were documented.   - Physical Examination (PE): On physical examination, the recurrent keloid on the chest measured approximately 2 by 1 centimeter. It was noted to have decreased in size and appeared softer following the CO2 laser treatment. The wound was reported to be healed.   Assessment and Plan:   - 0:     - Recurrent Keloid to the Chest: The patient has a recurrent keloid on her chest that has shown improvement following the recent CO2 laser treatment. The keloid has decreased in size and appears softer.     - Discontinue Bacitracin and start moisturizer.      - Schedule a follow-up appointment in one month.      - Plan for steroid injections during the next visit.      - Schedule a second CO2 laser treatment in September.    [Wound Care] : wound care

## 2024-06-06 NOTE — REASON FOR VISIT
[Revisit] : revisit [Were you seen in the Emergency Room?] : not seen in the emergency room [Were you admitted to the burn center at [facility]?] : not admitted to the burn center at [facility]
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
## 2024-06-06 NOTE — PHYSICAL EXAM
[Closed] : closed [Size%: ______] : Size: [unfilled]% [Infected?] : Infected: No [0] : 0 out of 10 [Normal] : normal [Small] : small  [] : no [de-identified] :  Subjective:   - Summary: The patient, Susan Garrison, a 34-year-old female, is here for a follow-up visit after undergoing CO2 laser treatment two weeks ago for a recurrent keloid on her chest.   - Chief Complaint (CC): Follow-up evaluation of recurrent keloid on the chest, status post CO2 laser treatment two weeks ago.   - History of Present Illness: Susan Garrison, a 34-year-old female, presented for a follow-up visit regarding a recurrent keloid on her chest. Two weeks prior, she underwent CO2 laser treatment for the keloid. During the current visit, she reported mild tenderness and pain at the treatment site. However, she denied any chest pain, abdominal pain, cardiac symptoms, or heart-related discomfort.   - Past Medical History: The patient has no significant past medical history reported.   - Past Surgical History: No past surgical history was provided.   - Family History:   - Social History:   - Review of Systems: No specific review of systems was documented.   - General:   - Neurological:   - Musculoskeletal:   - Cardiovascular:   - Respiratory:   - Gastrointestinal:   - Genitourinary:   - Integumentary:   - Psychiatric:   - Medications:   - Allergies:   Objective:   - Diagnostic Results: No diagnostic results were provided.   - Vital Signs: No vital signs were documented.   - Physical Examination (PE): On physical examination, the recurrent keloid on the chest measured approximately 2 by 1 centimeter. It was noted to have decreased in size and appeared softer following the CO2 laser treatment. The wound was reported to be healed.   Assessment and Plan:   - 0:     - Recurrent Keloid to the Chest: The patient has a recurrent keloid on her chest that has shown improvement following the recent CO2 laser treatment. The keloid has decreased in size and appears softer.     - Discontinue Bacitracin and start moisturizer.      - Schedule a follow-up appointment in one month.      - Plan for steroid injections during the next visit.      - Schedule a second CO2 laser treatment in September.    [TWNoteComboBox1] : bandaid

## 2024-06-07 DIAGNOSIS — L91.0 HYPERTROPHIC SCAR: ICD-10-CM

## 2024-06-07 DIAGNOSIS — L98.8 OTHER SPECIFIED DISORDERS OF THE SKIN AND SUBCUTANEOUS TISSUE: ICD-10-CM

## 2024-06-07 DIAGNOSIS — Z98.890 OTHER SPECIFIED POSTPROCEDURAL STATES: ICD-10-CM

## 2024-06-11 ENCOUNTER — APPOINTMENT (OUTPATIENT)
Dept: PLASTIC SURGERY | Facility: CLINIC | Age: 34
End: 2024-06-11
Payer: COMMERCIAL

## 2024-06-11 DIAGNOSIS — L91.0 HYPERTROPHIC SCAR: ICD-10-CM

## 2024-06-11 PROCEDURE — 99212 OFFICE O/P EST SF 10 MIN: CPT

## 2024-06-11 NOTE — PHYSICAL EXAM
[de-identified] : Well developed, well nourished in NAD  [de-identified] : central chest with raised 2.5x1 cm keloid scar, raised and red in color

## 2024-06-11 NOTE — ASSESSMENT
[FreeTextEntry1] : 35 yo F s/p excision of central chest keloid with Kenalog injection now with recurrent keloid.  Seeing Dr. Perez for laser treatment but considering re-excision and RT.  - consultation with Dr. Mathew for future radiation treatment following re-excision  - f/u 3 months   as above to have one more laser tx w Dr Perez   if no response then likely re-excision w post excision RT  pt agrees w plan  f/u iun fall  all ?s asnwered

## 2024-06-11 NOTE — HISTORY OF PRESENT ILLNESS
[FreeTextEntry1] : 32 y/o female with no significant pmh who presents for evaluation of central chest keloid.   Occ: self employed  measures approx 1cm x 3xcm no prior tx  nonsmoker nondiabetic  Interval hx (1/5/24): Pt is 2 weeks s/p excision of central chest keloid with kenalog injection. Here for her first post-op. Doing well, denies fever/chills/drainage or tenderness.   Interval hx (1/29/24): Pt is almost 6 weeks s/p excision of central chest keloid with kenalog injection. Here with concern of new opening on incision line. Reports small amount ot thin yellow tinged drainage. Using silicone strip to remainder of incision. Denies f/c.   Interval hx (4/9/24): Pt is here 3.5 months s/p excision of central chest keloid with kenalog injection concerned with keloid recurrence c/o pressure, discomfort and unpleasant aesthetic result. Pt has used silicone tape after surgery.   Interval hx (6/11/24). Pt presents today for f/u. Has seen Dr. Perez and underwent one laser treatment for her recurrent chest keloid with some improvement. Pt is scheduled to have additional laser but is considering excision with XRT in the future.

## 2024-06-27 ENCOUNTER — APPOINTMENT (OUTPATIENT)
Dept: BURN CARE | Facility: CLINIC | Age: 34
End: 2024-06-27

## 2024-07-09 ENCOUNTER — OUTPATIENT (OUTPATIENT)
Dept: OUTPATIENT SERVICES | Facility: HOSPITAL | Age: 34
LOS: 1 days | End: 2024-07-09
Payer: COMMERCIAL

## 2024-07-09 DIAGNOSIS — Z98.890 OTHER SPECIFIED POSTPROCEDURAL STATES: Chronic | ICD-10-CM

## 2024-07-09 DIAGNOSIS — K02.7 DENTAL ROOT CARIES: ICD-10-CM

## 2024-07-09 PROCEDURE — D3330: CPT

## 2024-07-09 PROCEDURE — D0230: CPT

## 2024-07-09 PROCEDURE — D0220: CPT

## 2024-07-10 DIAGNOSIS — K02.7 DENTAL ROOT CARIES: ICD-10-CM

## 2024-07-16 ENCOUNTER — OUTPATIENT (OUTPATIENT)
Dept: OUTPATIENT SERVICES | Facility: HOSPITAL | Age: 34
LOS: 1 days | End: 2024-07-16

## 2024-07-16 DIAGNOSIS — Z98.890 OTHER SPECIFIED POSTPROCEDURAL STATES: Chronic | ICD-10-CM

## 2024-07-16 DIAGNOSIS — K02.52 DENTAL CARIES ON PIT AND FISSURE SURFACE PENETRATING INTO DENTIN: ICD-10-CM

## 2024-07-16 PROCEDURE — D3330: CPT

## 2024-10-15 ENCOUNTER — APPOINTMENT (OUTPATIENT)
Dept: PLASTIC SURGERY | Facility: CLINIC | Age: 34
End: 2024-10-15

## 2025-06-22 NOTE — ED ADULT NURSE NOTE - NSFALLRSKASSESASSIST_ED_ALL_ED
History gastric bypass now with absorption all malnutrition.  Patient has worked with nutrition consultation in the past and is trying to incorporate more protein into her diet.  Albumin 1.6  Significant lower extremity edema  Continue to follow-up outpatient with bariatric services at [facility]  Reports compliance with supplements including calcium, vitamin D, copper, iron, vitamin C, vitamin K, MVI, vitamin A, vitamin D, zinc  Body mass index is 39.28 kg/m².      no